# Patient Record
Sex: MALE | Race: WHITE | Employment: OTHER | URBAN - NONMETROPOLITAN AREA
[De-identification: names, ages, dates, MRNs, and addresses within clinical notes are randomized per-mention and may not be internally consistent; named-entity substitution may affect disease eponyms.]

---

## 2022-08-04 ENCOUNTER — OUTSIDE SERVICES (OUTPATIENT)
Dept: FAMILY MEDICINE CLINIC | Age: 77
End: 2022-08-04
Payer: MEDICARE

## 2022-08-04 DIAGNOSIS — N32.81 OVERACTIVE BLADDER: ICD-10-CM

## 2022-08-04 DIAGNOSIS — F03.90 DEMENTIA WITHOUT BEHAVIORAL DISTURBANCE, UNSPECIFIED DEMENTIA TYPE: ICD-10-CM

## 2022-08-04 DIAGNOSIS — R45.1 AGITATION: Primary | ICD-10-CM

## 2022-08-04 DIAGNOSIS — I25.10 CORONARY ARTERY DISEASE WITHOUT ANGINA PECTORIS, UNSPECIFIED VESSEL OR LESION TYPE, UNSPECIFIED WHETHER NATIVE OR TRANSPLANTED HEART: ICD-10-CM

## 2022-08-04 DIAGNOSIS — E78.5 HYPERLIPIDEMIA, UNSPECIFIED HYPERLIPIDEMIA TYPE: ICD-10-CM

## 2022-08-04 NOTE — PROGRESS NOTES
8/4/2022    Laureen Esparza  1945    This resident is being seen today for a follow-up evaluation visit. He is a resident who has long-term medical conditions including dementia with cognitive decline, type II non-insulin-dependent diabetes mellitus, nonproliferative retinopathy, hyperlipidemia, coronary disease status post CABG with percutaneous stenting, hypertension, situational depressive disorder, overactive bladder, COPD, episodic agitation with visual hallucinations and paranoia. He is a 68 y.o. male resident who is being seen today for a follow-up evaluation visit with resident residing here in the memory care unit and has been under assessment for agitation with hallucinations. This resident has had history of visual hallucinations with underlying paranoia and extreme jealousy and what could be considered sexual fixation with frustration. He did specifically ask me for the \"blue pill. \"  Apparently he did already speak with Dr. Erica Parham regarding this and it was not recommended at the time. It is of note to mention that his wife does reside here at the facility and lives across the bal in another room. He denies complaints with respect to pain, headaches or dizziness, sore throat, chest pain, coughing or shortness of breath, nausea or vomiting, constipation or diarrhea, dysuria or frequency, fever or chills, falls or syncopal events.         Medications:  Anoro Ellipta 62.5-25 1 puff daily  Artificial tears 1.4% 1 drop both eyes daily  Aspirin 81 mg daily  Atorvastatin 40 mg daily  Citalopram 10 mg daily  Clopidogrel 75 mg daily  Donepezil 10 mg twice daily  Gemtesa 75 mg daily  Glipizide 7.5 mg daily  Janumet XR  mg twice daily  Losartan 100 mg daily  Memantine 10 mg twice daily  Montelukast 10 mg daily  Bella solution 2% 1 drop both eyes daily  Omeprazole 20 mg daily  Prednisolone 1% 5 mL right eye daily  Quetiapine 25 mg twice daily  Refresh drops 1 drop both eyes daily  Sodium chloride 5% 1 drop both eyes daily  Venlafaxine 37.5 mg daily  Vitamin D3 50,000 units weekly  Acetaminophen as needed  Ativan 0.5 mg 3 times daily as needed        Objective     Vital Signs: /55 pulse 79 respirations 18 temperature 97.3 O2 92% weight 208 pounds        Physical examination:Skin is essentially warm and dry. HEENT unremarkable. Neck is supple. Heart regular rate and rhythm. No rubs, gallops or murmurs noted. Lungs are consistent with bibasilar rales without evidence of rhonchi or wheezing. Abdomen is soft, supple and non-tender. Bowel sounds are noted x4 quadrants. No rigidity, guarding or rebound tenderness. Negative Donnelly's, negative McBurney's, negative Dharmesh's. Extremities; no true pitting edema. Pulses are adequate. No clubbing  or no cyanosis noted. He does have some motion restriction noted to the shoulders with abduction and extension. Neurologically he  is alert and oriented x2. No evidence of paralysis or paresthesias noted. Diagnoses and all orders for this visit:    Agitation  Comments:  Initiate Ativan 0.5 mg 3 times a day as needed    Dementia without behavioral disturbance, unspecified dementia type ([facility] [address]  Comments:  Maintain donepezil with low-dose memantine    Hyperlipidemia, unspecified hyperlipidemia type  Comments:  Continue with Plavix and atorvastatin    Coronary artery disease without angina pectoris, unspecified vessel or lesion type, unspecified whether native or transplanted heart  Comments:  Maintain aspirin, Plavix with lipid and blood pressure management    Overactive bladder  Comments:  Currently controlled        Plan:  Plan of care was discussed with the healthcare team with medications and labs reviewed. PSA 4.02 vitamin D of 17 lipid panel within normal limits BUNs/creatinine 20/1.17 with a GFR of 60 H/H of 10.8/32. 8.   There has been some concern from staffing perspective given the fact that this resident has underlying agitation especially noted with his hallucinations and his increasing paranoia. I am therefore going to recommend Ativan 0.5 mg 3 times a day as needed and furthermore follow-up with him in the course of the next couple of weeks to determine if the medication has been utilized and effective. He will otherwise continue with his current management as stated and I will see him routinely and as needed with further orders forthcoming. TA VASQUEZ, APRN - CNP      *Note was creating using voice recognition software.   The document was reviewed however grammatical errors may exist.

## 2022-09-08 ENCOUNTER — OUTSIDE SERVICES (OUTPATIENT)
Dept: FAMILY MEDICINE CLINIC | Age: 77
End: 2022-09-08
Payer: MEDICARE

## 2022-09-08 DIAGNOSIS — E11.9 DIABETES MELLITUS TYPE II, NON INSULIN DEPENDENT (HCC): Primary | ICD-10-CM

## 2022-09-08 DIAGNOSIS — R44.3 HALLUCINATIONS: ICD-10-CM

## 2022-09-08 DIAGNOSIS — I10 PRIMARY HYPERTENSION: ICD-10-CM

## 2022-09-08 DIAGNOSIS — H54.7 POOR VISION: ICD-10-CM

## 2022-09-08 DIAGNOSIS — F32.A DEPRESSION, UNSPECIFIED DEPRESSION TYPE: ICD-10-CM

## 2022-09-08 NOTE — PROGRESS NOTES
9/8/2022    Esther Velez  1945    This resident is being seen today for a follow-up evaluation visit. He is a resident who has long-term medical conditions including dementia with cognitive decline, type II non-insulin-dependent diabetes mellitus, nonproliferative retinopathy, hyperlipidemia, coronary disease status post CABG with percutaneous stenting, hypertension, situational depressive disorder, overactive bladder, COPD, episodic agitation with visual hallucinations and paranoia. He is a 68 y.o. male resident who is being seen today for a follow-up evaluation visit with resident having ongoing h hypertension allucinations, as described by himself as well as staff. Staff also brings to my attention that he does have extremely poor vision with history of macular degeneration and has had cornea replacement several times. He has otherwise been doing relatively well and we have been checking his blood sugars twice a week, which have been within normal limits. At the time my evaluation he denied any complaints regarding pain, headaches or dizziness, sore throat, chest pain, coughing or shortness of breath, nausea or vomiting, constipation or diarrhea, dysuria or frequency, fever or chills, falls or syncopal events.             Medications:  Anoro Ellipta 62.5-25 1 puff daily  Artificial tears 1.4% 1 drop both eyes daily  Aspirin 81 mg daily  Atorvastatin 40 mg daily  Citalopram 10 mg daily  Clopidogrel 75 mg daily  Donepezil 10 mg twice daily  Gemtesa 75 mg daily  Glipizide 7.5 mg daily  Janumet XR  mg twice daily  Losartan 100 mg daily  Memantine 10 mg twice daily  Montelukast 10 mg daily  Bella solution 2% 1 drop both eyes daily  Omeprazole 20 mg daily  Prednisolone 1% 5 mL right eye daily  Quetiapine 25 mg twice daily  Refresh drops 1 drop both eyes daily  Sodium chloride 5% 1 drop both eyes daily  Venlafaxine 37.5 mg daily  Vitamin D3 50,000 units weekly  Acetaminophen as needed  Ativan 0.5 mg 3 times daily as needed        Objective     Vital Signs: /55 pulse 65 respirations 18 temperature 97.2 O2 94% weight 214.4 (up 6.2 pounds) pounds        Physical examination:Skin is essentially warm and dry. HEENT unremarkable. Neck is supple. Heart regular rate and rhythm. No rubs, gallops or murmurs noted. Lungs are consistent with bibasilar rales without evidence of rhonchi or wheezing. Abdomen is soft, supple and non-tender. Bowel sounds are noted x4 quadrants. No rigidity, guarding or rebound tenderness. Negative Donnelly's, negative McBurney's, negative Dharmesh's. Extremities; no true pitting edema. Pulses are adequate. No clubbing  or no cyanosis noted. He does have some motion restriction noted to the shoulders with abduction and extension. Neurologically he  is alert and oriented x2. No evidence of paralysis or paresthesias noted. Diagnoses and all orders for this visit:    Diabetes mellitus type II, non insulin dependent ([facility] [address]  Comments:  Maintain low-dose aspirin with glipizide and Janumet and Gemtesa    Hallucinations  Comments:  Maintain low-dose Seroquel with hallucinations possibly secondary to poor vision    Poor vision  Comments:  History of macular degeneration with cornea replacement    Primary hypertension  Comments:  Maintain low-dose aspirin and losartan    Depression, unspecified depression type  Comments:  Controlled with venlafaxine          Plan:  Plan of care was discussed with the healthcare team with medications and labs reviewed. As I did state, his blood sugars have been relatively stable, slowly recommend that we downward titrate his blood sugar Accu-Cheks from twice a week to once per week.   With regards to the fact that he does remain at increased risk of exposure to COVID, he will continue with upper titration of his vitamin D along with the benefit of vitamin C and zinc.  We will continue to monitor him regarding his hallucinations and maintain his current medical regimen as such. He will otherwise maintain his plan of care we will see him routinely and as needed with further orders forthcoming. TA VASQUEZ, APRN - CNP      *Note was creating using voice recognition software.   The document was reviewed however grammatical errors may exist.

## 2022-10-12 ENCOUNTER — OUTSIDE SERVICES (OUTPATIENT)
Dept: FAMILY MEDICINE CLINIC | Age: 77
End: 2022-10-12
Payer: MEDICARE

## 2022-10-12 DIAGNOSIS — N32.81 OVERACTIVE BLADDER: ICD-10-CM

## 2022-10-12 DIAGNOSIS — I25.10 CORONARY ARTERY DISEASE WITHOUT ANGINA PECTORIS, UNSPECIFIED VESSEL OR LESION TYPE, UNSPECIFIED WHETHER NATIVE OR TRANSPLANTED HEART: ICD-10-CM

## 2022-10-12 DIAGNOSIS — I10 PRIMARY HYPERTENSION: ICD-10-CM

## 2022-10-12 DIAGNOSIS — F32.A DEPRESSION, UNSPECIFIED DEPRESSION TYPE: ICD-10-CM

## 2022-10-12 DIAGNOSIS — R44.3 HALLUCINATIONS: Primary | ICD-10-CM

## 2022-10-12 NOTE — PROGRESS NOTES
10/12/2022    Keith Garza  1945    This resident is being seen today for a follow-up evaluation visit. He is a resident who has long-term medical conditions including dementia with cognitive decline, type II non-insulin-dependent diabetes mellitus, nonproliferative retinopathy, hyperlipidemia, coronary disease status post CABG with percutaneous stenting, hypertension, situational depressive disorder, overactive bladder, COPD, episodic agitation with visual hallucinations and paranoia. He is a 68 y.o. male resident who is being seen today for a follow-up evaluation visit with staff indicating that he has been relatively stable but he continues to have hallucinations from time to time. He does reside here on assisted living and Staff indicates that he has had no changes in behavior in terms of aggressive, combative, or disruptive behaviors. He currently denies complaints with respect to pain,headaches or dizziness, sore throat, chest pain, coughing or shortness of breath, nausea or vomiting, constipation or diarrhea, dysuria or frequency, fever or chills, falls or syncopal events.             Medications:  Anoro Ellipta 62.5-25 1 puff daily  Artificial tears 1.4% 1 drop both eyes daily  Aspirin 81 mg daily  Atorvastatin 40 mg daily  Citalopram 10 mg daily  Clopidogrel 75 mg daily  Donepezil 10 mg twice daily  Gemtesa 75 mg daily  Glipizide 7.5 mg daily  Janumet XR  mg twice daily  Losartan 100 mg daily  Memantine 10 mg twice daily  Montelukast 10 mg daily  Bella solution 2% 1 drop both eyes daily  Omeprazole 20 mg daily  Prednisolone 1% 5 mL right eye daily  Quetiapine 25 mg twice daily  Refresh drops 1 drop both eyes daily  Sodium chloride 5% 1 drop both eyes daily  Venlafaxine 37.5 mg daily  Vitamin D3 50,000 units weekly  Acetaminophen as needed  Ativan 0.5 mg 3 times daily as needed        Objective     Vital Signs: /56 pulse 81 respirations 16 temperature 97.6 O2 96% weight 216.8 pounds        Physical examination:Skin is essentially warm and dry. HEENT unremarkable. Neck is supple. Heart regular rate and rhythm. No rubs, gallops or murmurs noted. Lungs are consistent with bibasilar rales without evidence of rhonchi or wheezing. Abdomen is soft, supple and non-tender. Bowel sounds are noted x4 quadrants. No rigidity, guarding or rebound tenderness. Negative Donnelly's, negative McBurney's, negative Dharmesh's. Extremities; no true pitting edema. Pulses are adequate. No clubbing  or no cyanosis noted. He does have some motion restriction noted to the shoulders with abduction and extension. Neurologically he  is alert and oriented x2. No evidence of paralysis or paresthesias noted. Diagnoses and all orders for this visit:    Hallucinations  Comments:  Occurs from time to time and does maintain the benefit of quetiapine     Overactive bladder  Comments:  Stable    Coronary artery disease without angina pectoris, unspecified vessel or lesion type, unspecified whether native or transplanted heart  Comments:  Stable with low-dose aspirin with lipid and blood pressure management    Primary hypertension  Comments:  Maintain aspirin    Depression, unspecified depression type  Comments:  Stable with citalopram            Plan:  Plan of care was discussed with the healthcare team with medications and labs reviewed. Most recent vitamin D level of 67. However, he has not had his routine labs in an extended period of time so I will recommend follow-up labs in terms of a CBC, CMP, lipid panel and a hemoglobin A1c, to be drawn in the morning. He will furthermore continue with his current medical management, as he does appear to be tolerating it in a satisfactory manner. I will maintain recommendations to monitor his blood sugar on a weekly basis. He will otherwise continue with his plan of care as stated and I will see him routinely and as needed with further orders forthcoming.       Elpidio Joy ZOE VASQUEZ - CNP      *Note was creating using voice recognition software.   The document was reviewed however grammatical errors may exist.

## 2022-11-03 ENCOUNTER — OUTSIDE SERVICES (OUTPATIENT)
Dept: FAMILY MEDICINE CLINIC | Age: 77
End: 2022-11-03
Payer: MEDICARE

## 2022-11-03 DIAGNOSIS — K52.9 ACUTE GASTROENTERITIS: Primary | ICD-10-CM

## 2022-11-03 DIAGNOSIS — E78.5 HYPERLIPIDEMIA, UNSPECIFIED HYPERLIPIDEMIA TYPE: ICD-10-CM

## 2022-11-03 DIAGNOSIS — I25.10 CORONARY ARTERY DISEASE WITHOUT ANGINA PECTORIS, UNSPECIFIED VESSEL OR LESION TYPE, UNSPECIFIED WHETHER NATIVE OR TRANSPLANTED HEART: ICD-10-CM

## 2022-11-03 DIAGNOSIS — F03.90 DEMENTIA, UNSPECIFIED DEMENTIA SEVERITY, UNSPECIFIED DEMENTIA TYPE, UNSPECIFIED WHETHER BEHAVIORAL, PSYCHOTIC, OR MOOD DISTURBANCE OR ANXIETY (HCC): ICD-10-CM

## 2022-11-03 DIAGNOSIS — I10 PRIMARY HYPERTENSION: ICD-10-CM

## 2022-11-03 NOTE — PROGRESS NOTES
11/3/2022    Sangeeta Medley  1945    This resident is being seen today for a follow-up evaluation visit. He is a resident who has long-term medical conditions including dementia with cognitive decline, type II non-insulin-dependent diabetes mellitus, nonproliferative retinopathy, hyperlipidemia, coronary disease status post CABG with percutaneous stenting, hypertension, situational depressive disorder, overactive bladder, COPD, episodic agitation with visual hallucinations and paranoia. He is a 68 y.o. male resident who is being seen today for a follow-up evaluation visit with staff admitting that this resident does have a morning cough and does continue to utilize his as needed inhaler and follows up with the department of pulmonary medicine this month. He did have a recent gastroenteritis which lasted about 48 hours on the weekend of the 22nd. He furthermore denies any sore throat, chest pain, nausea or vomiting, constipation or diarrhea, dysuria or frequency, fever or chills, falls or syncopal events. Medications:  Anoro Ellipta 62.5-25 1 puff daily  Artificial tears 1.4% 1 drop both eyes daily  Aspirin 81 mg daily  Atorvastatin 40 mg daily  Citalopram 10 mg daily  Clopidogrel 75 mg daily  Donepezil 10 mg twice daily  Gemtesa 75 mg daily  Glipizide 7.5 mg daily  Janumet XR  mg twice daily  Losartan 100 mg daily  Memantine 10 mg twice daily  Montelukast 10 mg daily  Bella solution 2% 1 drop both eyes daily  Omeprazole 20 mg daily  Prednisolone 1% 5 mL right eye daily  Quetiapine 25 mg twice daily  Refresh drops 1 drop both eyes daily  Sodium chloride 5% 1 drop both eyes daily  Venlafaxine 37.5 mg daily  Vitamin D3 50,000 units weekly  Acetaminophen as needed  Ativan 0.5 mg 3 times daily as needed        Objective     Vital Signs: /62 pulse 84 respirations 18 temperature 97.3 O2 96% weight 220.2 (up 3.4 pounds) pounds        Physical examination:Skin is essentially warm and dry.  HEENT unremarkable. Neck is supple. Heart regular rate and rhythm. No rubs, gallops or murmurs noted. Lungs are consistent with bibasilar rales without evidence of rhonchi or wheezing. Abdomen is soft, supple and non-tender. Bowel sounds are noted x4 quadrants. No rigidity, guarding or rebound tenderness. Negative Donnelly's, negative McBurney's, negative Dharmesh's. Extremities; no true pitting edema. Pulses are adequate. No clubbing  or no cyanosis noted. He does have some motion restriction noted to the shoulders with abduction and extension. Neurologically he  is alert and oriented x2. No evidence of paralysis or paresthesias noted. Diagnoses and all orders for this visit:    Acute gastroenteritis  Comments:  Symptoms resolved    Hyperlipidemia, unspecified hyperlipidemia type  Comments:  Controlled with aspirin and atorvastatin    Dementia, unspecified dementia severity, unspecified dementia type, unspecified whether behavioral, psychotic, or mood disturbance or anxiety ([facility] [address]  Comments:  Continue with donepezil and memantine    Coronary artery disease without angina pectoris, unspecified vessel or lesion type, unspecified whether native or transplanted heart  Comments:  Maintain aspirin with lipid and blood pressure management    Primary hypertension  Comments:  Continue with aspirin and losartan              Plan:  Plan of care was discussed with the healthcare team with medications and labs reviewed. Lipid panel within normal limits BUN/creatinine 22/1.22 with a GFR of 61 hemoglobin A1c is 6.5 H/H of 10/31. As stated, his symptoms have resolved with respect to his gastroenteritis. He does continue with his inhaler as needed and will follow up with pulmonary medicine as stated. I will plan to follow-up with him in the course of 1 month for further reevaluation. He will otherwise continue with his current management and I will see him routinely and as needed with further orders forthcoming.       Kate Nails ZOE VANEGAS - CNP      *Note was creating using voice recognition software.   The document was reviewed however grammatical errors may exist.

## 2022-12-01 ENCOUNTER — OUTSIDE SERVICES (OUTPATIENT)
Dept: FAMILY MEDICINE CLINIC | Age: 77
End: 2022-12-01

## 2022-12-01 DIAGNOSIS — H54.7 POOR VISION: ICD-10-CM

## 2022-12-01 DIAGNOSIS — I25.10 CORONARY ARTERY DISEASE WITHOUT ANGINA PECTORIS, UNSPECIFIED VESSEL OR LESION TYPE, UNSPECIFIED WHETHER NATIVE OR TRANSPLANTED HEART: ICD-10-CM

## 2022-12-01 DIAGNOSIS — N32.81 OVERACTIVE BLADDER: ICD-10-CM

## 2022-12-01 DIAGNOSIS — E66.09 OBESITY DUE TO EXCESS CALORIES, UNSPECIFIED CLASSIFICATION, UNSPECIFIED WHETHER SERIOUS COMORBIDITY PRESENT: Primary | ICD-10-CM

## 2022-12-01 DIAGNOSIS — F32.A DEPRESSION, UNSPECIFIED DEPRESSION TYPE: ICD-10-CM

## 2022-12-01 NOTE — PROGRESS NOTES
12/1/2022    Pippa Mcdaniel  1945    This resident is being seen today for a follow-up evaluation visit. He is a resident who has long-term medical conditions including dementia with cognitive decline, type II non-insulin-dependent diabetes mellitus, nonproliferative retinopathy, hyperlipidemia, coronary disease status post CABG with percutaneous stenting, hypertension, situational depressive disorder, overactive bladder, COPD, episodic agitation with visual hallucinations and paranoia. He is a 68 y.o. male resident who is being seen today for a follow-up evaluation with this resident recently seen in conjunction with the department of pulmonology with Dr. Zaria Matthews. This is a gentleman who has a history of COPD and is a former smoker and was placed on a neuro inhaler twice daily and has recommendations for low-dose CAT scan of the chest in the near future. Pulmonologist did state that he should continue with 2 L of oxygen via nasal cannula at bedtime. He had also recommended that he lose weight due to obesity with staff indicating that he does have a relatively good appetite. He does remain responsive to verbal and tactile stimuli and he denies complaints regarding any pain, headaches or dizziness, sore throat, chest pain, coughing or shortness of breath, nausea or vomiting, constipation or diarrhea, dysuria or frequency, fever or chills, falls or syncopal events.                 Medications:  Anoro Ellipta 62.5-25 1 puff daily  Artificial tears 1.4% 1 drop both eyes daily  Aspirin 81 mg daily  Atorvastatin 40 mg daily  Citalopram 10 mg daily  Clopidogrel 75 mg daily  Donepezil 10 mg twice daily  Gemtesa 75 mg daily  Glipizide 7.5 mg daily  Janumet XR  mg twice daily  Losartan 100 mg daily  Memantine 10 mg twice daily  Montelukast 10 mg daily  Bella solution 2% 1 drop both eyes daily  Omeprazole 20 mg daily  Prednisolone 1% 5 mL right eye daily  Quetiapine 25 mg twice daily  Refresh drops 1 drop both continue to encourage him to cut back on his dietary intake due to the need for weight loss due to underlying obesity. He will continue to follow with Dr. Kate Zamora accordingly, with recommendations to follow-up in a year. He will otherwise maintain his plan of care as stated and I will see him routinely and as needed with further orders forthcoming. TA VASQUEZ, APRN - CNP      *Note was creating using voice recognition software.   The document was reviewed however grammatical errors may exist.

## 2023-01-18 LAB — TSH SERPL DL<=0.05 MIU/L-ACNC: 2.24 UIU/ML (ref 0.27–4.2)

## 2023-02-03 ENCOUNTER — OUTSIDE SERVICES (OUTPATIENT)
Dept: FAMILY MEDICINE CLINIC | Age: 78
End: 2023-02-03

## 2023-02-03 DIAGNOSIS — F03.90 DEMENTIA, UNSPECIFIED DEMENTIA SEVERITY, UNSPECIFIED DEMENTIA TYPE, UNSPECIFIED WHETHER BEHAVIORAL, PSYCHOTIC, OR MOOD DISTURBANCE OR ANXIETY (HCC): ICD-10-CM

## 2023-02-03 DIAGNOSIS — E11.9 DIABETES MELLITUS TYPE II, NON INSULIN DEPENDENT (HCC): ICD-10-CM

## 2023-02-03 DIAGNOSIS — R60.9 PERIPHERAL EDEMA: ICD-10-CM

## 2023-02-03 DIAGNOSIS — R06.02 SHORTNESS OF BREATH: Primary | ICD-10-CM

## 2023-02-03 DIAGNOSIS — I10 PRIMARY HYPERTENSION: ICD-10-CM

## 2023-02-03 NOTE — PROGRESS NOTES
2/3/2023    Keon Koch  1945    This resident is being seen today for a follow-up evaluation visit. He is a resident who has long-term medical conditions including dementia with cognitive decline, type II non-insulin-dependent diabetes mellitus, nonproliferative retinopathy, hyperlipidemia, coronary disease status post CABG with percutaneous stenting, hypertension, situational depressive disorder, overactive bladder, COPD, episodic agitation with visual hallucinations and paranoia. He is a 68 y.o. male resident who is being seen today for a follow-up visit with resident recently under assessment for an upper respiratory infection with which she was given the benefit of a Z-Geovanni with a Medrol Dosepak, both of which began on 1/26/2023. Unfortunately, he does continue to have cough with increased shortness of breath and notable audible wheezing. He denies any chest pain, nausea or vomiting, constipation or diarrhea, dysuria or frequency, fever or chills, falls or syncopal events. Medications:  Anoro Ellipta 62.5-25 1 puff daily  Artificial tears 1.4% 1 drop both eyes daily  Aspirin 81 mg daily  Atorvastatin 40 mg daily  Citalopram 10 mg daily  Clopidogrel 75 mg daily  Donepezil 10 mg twice daily  Gemtesa 75 mg daily  Glipizide 7.5 mg daily  Janumet XR  mg twice daily  Losartan 100 mg daily  Memantine 10 mg twice daily  Montelukast 10 mg daily  Bella solution 2% 1 drop both eyes daily  Omeprazole 20 mg daily  Prednisolone 1% 5 mL right eye daily  Quetiapine 25 mg twice daily  Refresh drops 1 drop both eyes daily  Sodium chloride 5% 1 drop both eyes daily  Venlafaxine 37.5 mg daily  Vitamin D3 50,000 units weekly  Acetaminophen as needed  Ativan 0.5 mg 3 times daily as needed        Objective     Vital Signs: /84 pulse 72 respirations 18 temperature 97.9 O2 93%  weight 226.8 pounds        Physical examination:Skin is essentially warm and dry. HEENT unremarkable. Neck is supple.  Heart regular rate and rhythm. No rubs, gallops or murmurs noted. Lungs are consistent with notable crackles throughout with an expiratory wheeze. Abdomen is soft, supple and non-tender. Bowel sounds are noted x4 quadrants. No rigidity, guarding or rebound tenderness. Negative Donnelly's, negative McBurney's, negative Dharmesh's. Extremities; mild 1+ pitting edema to the bilateral lower extremities. Pulses are adequate. No clubbing  or no cyanosis noted. He does have some motion restriction noted to the shoulders with abduction and extension. Neurologically he  is alert and oriented x2. No evidence of paralysis or paresthesias noted. Diagnoses and all orders for this visit:    Shortness of breath  Comments:  Initiate Levaquin with DuoNeb's, prednisone taper, diuretic management    Peripheral edema  Comments:  Initiate Lasix over the course of the next 10 days with potassium supplementation    Dementia, unspecified dementia severity, unspecified dementia type, unspecified whether behavioral, psychotic, or mood disturbance or anxiety ([facility])  Comments:  Maintain low-dose aspirin with memantine and donepezil    Diabetes mellitus type II, non insulin dependent ([facility])  Comments:  Maintain low-dose aspirin, glipizide, Janumet    Primary hypertension  Comments:  Maintain low-dose aspirin and losartan                  Plan:  Plan of care was discussed with the healthcare team with medications and labs reviewed. I do feel that this resident does need ongoing antibiotic therapy. I am therefore going to recommend Levaquin 750 mg daily for the course of the next 10 days with DuoNeb's 3 times a day for the course of 10 days and prednisone 30 mg daily for 3 days then 20 mg daily for 3 days then 10 mg daily for 3 days then 5 mg daily for 3 days with discontinuation thereafter.   I will furthermore place him on Lasix 40 mg daily for 3 days then 20 mg daily for 7 days along with potassium supplementation and evaluation of a BMP in 1 month.  He will otherwise continue with his current management and I will plan to reevaluate him in the course of the next 1 to 2 weeks. TA VASQUEZ, APRN - CNP      *Note was creating using voice recognition software.   The document was reviewed however grammatical errors may exist.

## 2023-02-08 ENCOUNTER — OUTSIDE SERVICES (OUTPATIENT)
Dept: FAMILY MEDICINE CLINIC | Age: 78
End: 2023-02-08

## 2023-02-08 DIAGNOSIS — I25.10 CORONARY ARTERY DISEASE WITHOUT ANGINA PECTORIS, UNSPECIFIED VESSEL OR LESION TYPE, UNSPECIFIED WHETHER NATIVE OR TRANSPLANTED HEART: ICD-10-CM
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
## 2023-02-08 DIAGNOSIS — R42 DIZZINESS: ICD-10-CM

## 2023-02-08 DIAGNOSIS — E86.0 DEHYDRATION: ICD-10-CM

## 2023-02-08 DIAGNOSIS — E78.5 HYPERLIPIDEMIA, UNSPECIFIED HYPERLIPIDEMIA TYPE: ICD-10-CM

## 2023-02-09 NOTE — PROGRESS NOTES
2/8/2023    Li Butt  1945    This resident is being seen today for an acute evaluation visit. He is a resident who has long-term medical conditions including dementia with cognitive decline, type II non-insulin-dependent diabetes mellitus, nonproliferative retinopathy, hyperlipidemia, coronary disease status post CABG with percutaneous stenting, hypertension, situational depressive disorder, overactive bladder, COPD, episodic agitation with visual hallucinations and paranoia. He is a 68 y.o. male resident who is being seen today for an acute evaluation visit secondary to increased weakness with what was being described as dizziness and a fall. It is of note to mention that he has been under assessment for an upper respiratory infection was given the benefit of Levaquin. Staff indicates that his cough is improved but he does remain short of breath. He did have imaging studies completed on 2/7/2023 with evidence of poor inspiratory effort without active airspace disease. Furthermore, he also had a completion of a CT of his head at that time with volume loss and small vessel ischemic changes. Testing was completed in the hospital setting due to his fall with staff concern regarding new onset weakness. At this point in time, resident admits to shortness of breath but denies chest pain, nausea or vomiting, constipation or diarrhea, dysuria or frequency, fever or chills, syncopal events or seizure activity.              Medications:  Anoro Ellipta 62.5-25 1 puff daily  Artificial tears 1.4% 1 drop both eyes daily  Aspirin 81 mg daily  Atorvastatin 40 mg daily  Citalopram 10 mg daily  Clopidogrel 75 mg daily  Donepezil 10 mg twice daily  Gemtesa 75 mg daily  Glipizide 7.5 mg daily  Janumet XR  mg twice daily  Losartan 100 mg daily  Memantine 10 mg twice daily  Montelukast 10 mg daily  Bella solution 2% 1 drop both eyes daily  Omeprazole 20 mg daily  Prednisolone 1% 5 mL right eye daily  Quetiapine 25 mg twice daily  Refresh drops 1 drop both eyes daily  Sodium chloride 5% 1 drop both eyes daily  Venlafaxine 37.5 mg daily  Vitamin D3 50,000 units weekly  Acetaminophen as needed  Ativan 0.5 mg 3 times daily as needed        Objective     Vital Signs: /82 pulse 100 respirations 18 temperature 97.7 O2 92%  weight 226.8 pounds        Physical examination:Skin is essentially warm and dry. HEENT unremarkable. Neck is supple. Heart regular rate and rhythm. No rubs, gallops or murmurs noted. Lungs are essentially clear to auscultation without evidence of any rhonchi, rales or wheezing at this time. .  Abdomen is soft, supple and non-tender. Bowel sounds are noted x4 quadrants. No rigidity, guarding or rebound tenderness. Negative Donnelly's, negative McBurney's, negative Dharmesh's. Extremities; mild 1+ pitting edema to the bilateral lower extremities. Pulses are adequate. No clubbing  or no cyanosis noted. He does have some motion restriction noted to the shoulders with abduction and extension. Neurologically he  is alert and oriented x2. No evidence of paralysis or paresthesias noted. Diagnoses and all orders for this visit:    Fall, sequela  Comments:  Seen in the emergency room setting on 2/7/2023 and imaging studies completed with respect to a CT of the head and a chest x-ray    Dizziness  Comments:  Possible side effect to Levaquin which will be discontinued at this time given the benefit of doxycycline    Dehydration  Comments:  Encourage intake of water    Coronary artery disease without angina pectoris, unspecified vessel or lesion type, unspecified whether native or transplanted heart  Comments:  Maintain aspirin with lipid and blood pressure management    Hyperlipidemia, unspecified hyperlipidemia type  Comments:  Controlled with atorvastatin with low-dose aspirin                    Plan:  Plan of care was discussed with the healthcare team with medications and labs reviewed.   H/H 10/32.6 BUNs/creatinine 44/1.80 with a GFR of 38.1. CT of the head as discussed along with the chest x-ray, both of which were completed in the hospital setting. Due to the dizziness, I am going to recommend discontinuation of his Levaquin at this time, as it was the newest addition and it can cause dizziness. Given the fact that he does have the residual cough, I will place him on doxycycline 100 mg twice a day for 5 days. I will discontinue the recommendations for his BMP in the morning and obtain 1 in 1 week and encourage the resident to drink more water throughout the day. He will furthermore remain under assessment for his upper respiratory status and I will see him routinely and as needed with further orders forthcoming. TA VASQUEZ, APRN - CNP      *Note was creating using voice recognition software.   The document was reviewed however grammatical errors may exist.

## 2023-02-15 LAB
ANION GAP SERPL CALCULATED.3IONS-SCNC: 9 MMOL/L (ref 7–16)
BUN BLDV-MCNC: 21 MG/DL (ref 6–23)
CALCIUM SERPL-MCNC: 8.6 MG/DL (ref 8.6–10.2)
CHLORIDE BLD-SCNC: 107 MMOL/L (ref 98–107)
CO2: 27 MMOL/L (ref 22–29)
CREAT SERPL-MCNC: 1.4 MG/DL (ref 0.7–1.2)
GFR SERPL CREATININE-BSD FRML MDRD: 52 ML/MIN/1.73
GLUCOSE BLD-MCNC: 82 MG/DL (ref 74–99)
POTASSIUM SERPL-SCNC: 4.8 MMOL/L (ref 3.5–5)
SODIUM BLD-SCNC: 143 MMOL/L (ref 132–146)

## 2023-03-01 ENCOUNTER — OUTSIDE SERVICES (OUTPATIENT)
Dept: FAMILY MEDICINE CLINIC | Age: 78
End: 2023-03-01
Payer: MEDICARE

## 2023-03-01 DIAGNOSIS — I10 PRIMARY HYPERTENSION: ICD-10-CM

## 2023-03-01 DIAGNOSIS — R53.1 WEAKNESS: Primary | ICD-10-CM

## 2023-03-01 DIAGNOSIS — F32.A DEPRESSION, UNSPECIFIED DEPRESSION TYPE: ICD-10-CM

## 2023-03-01 DIAGNOSIS — D50.9 IRON DEFICIENCY ANEMIA, UNSPECIFIED IRON DEFICIENCY ANEMIA TYPE: ICD-10-CM

## 2023-03-01 DIAGNOSIS — N32.81 OVERACTIVE BLADDER: ICD-10-CM

## 2023-03-01 PROCEDURE — 99349 HOME/RES VST EST MOD MDM 40: CPT | Performed by: NURSE PRACTITIONER

## 2023-03-01 NOTE — PROGRESS NOTES
3/1/2023    Arabella Tapia  1945    This resident is being seen today for a follow-up evaluation visit. He is a resident who has long-term medical conditions including dementia with cognitive decline, type II non-insulin-dependent diabetes mellitus, nonproliferative retinopathy, hyperlipidemia, coronary disease status post CABG with percutaneous stenting, hypertension, situational depressive disorder, overactive bladder, COPD, episodic agitation with visual hallucinations and paranoia. He is a 68 y.o. male resident who is being seen today for a follow-up evaluation visit with resident residing in the memory care unit and was recently seen in conjunction with physical therapy services. He has been under assessment for upper respiratory tract infection/bronchitis and has been placed on antibiotic therapy in this regard. He did have some ongoing decline and was utilizing a wheelchair for the course of the week, with which therapy was recommended Staff indicates that he is doing relatively well in this regard and is now using a cane for ambulation. He offers no acute complaints on today's evaluation regarding any pain, headaches or dizziness, sore throat, chest pain, coughing or shortness of breath, nausea or vomiting, constipation or diarrhea, dysuria or frequency, fever or chills, falls or syncopal events.                Medications:  Anoro Ellipta 62.5-25 1 puff daily  Artificial tears 1.4% 1 drop both eyes daily  Aspirin 81 mg daily  Atorvastatin 40 mg daily  Citalopram 10 mg daily  Clopidogrel 75 mg daily  Donepezil 10 mg twice daily  Gemtesa 75 mg daily  Glipizide 7.5 mg daily  Janumet XR  mg twice daily  Losartan 100 mg daily  Memantine 10 mg twice daily  Montelukast 10 mg daily  Bella solution 2% 1 drop both eyes daily  Omeprazole 20 mg daily  Prednisolone 1% 5 mL right eye daily  Quetiapine 25 mg twice daily  Refresh drops 1 drop both eyes daily  Sodium chloride 5% 1 drop both eyes daily  Venlafaxine 37.5 mg daily  Vitamin D3 50,000 units weekly  Acetaminophen as needed  Ativan 0.5 mg 3 times daily as needed        Objective     Vital Signs: /73 pulse 68 respirations 18 temperature 96.7 O2 92%  weight 227.4 pounds        Physical examination:Skin is essentially warm and dry. HEENT unremarkable. Neck is supple. Heart regular rate and rhythm. No rubs, gallops or murmurs noted. Lungs are essentially clear to auscultation without evidence of any rhonchi, rales or wheezing at this time. .  Abdomen is soft, supple and non-tender. Bowel sounds are noted x4 quadrants. No rigidity, guarding or rebound tenderness. Negative Donnelly's, negative McBurney's, negative Dharmesh's. Extremities; mild 1+ pitting edema to the bilateral lower extremities. Pulses are adequate. No clubbing  or no cyanosis noted. He does have some motion restriction noted to the shoulders with abduction and extension. Neurologically he  is alert and oriented x2. No evidence of paralysis or paresthesias noted. Diagnoses and all orders for this visit:    Weakness  Comments:  Status post physical therapy with resident utilizing a cane at this time    Iron deficiency anemia, unspecified iron deficiency anemia type  Comments:  Status post initiation of iron twice daily with recommendations for repeat iron studies and a CBC on 3/8/2023    Primary hypertension  Comments:  Maintain low-dose aspirin with losartan    Depression, unspecified depression type  Comments:  Currently controlled and does maintain quetiapine with citalopram    Overactive bladder  Comments:  Stable                      Plan:  Plan of care was discussed with the healthcare team with medications and labs reviewed. BUNs/creatinine 14/1.80 with a GFR of 38.1 with an H/H of 10/32.6 PSA 3.67 iron level of 31 TIBC 376% saturation 8 with which he did have recent initiation of iron supplementation twice daily.   He does remain relatively stable but does continue with some ongoing edema to the bilateral lower extremities. He does maintain the benefit of BRUCE hose in this regard and I did recommend that he intermittently offload. Furthermore, I will make recommendations for follow-up labs with a repeat CBC, CMP, iron studies and a B12 level. He will furthermore maintain his plan of care as stated and I will see him routinely and as needed with further orders forthcoming. TA VASQUEZ, APRN - CNP      *Note was creating using voice recognition software.   The document was reviewed however grammatical errors may exist.

## 2023-03-08 LAB
ALBUMIN SERPL-MCNC: 3.5 G/DL (ref 3.5–5.2)
ALP BLD-CCNC: 96 U/L (ref 40–129)
ALT SERPL-CCNC: 14 U/L (ref 0–40)
ANION GAP SERPL CALCULATED.3IONS-SCNC: 11 MMOL/L (ref 7–16)
ANISOCYTOSIS: ABNORMAL
AST SERPL-CCNC: 17 U/L (ref 0–39)
BASOPHILS ABSOLUTE: 0.04 E9/L (ref 0–0.2)
BASOPHILS RELATIVE PERCENT: 0.7 % (ref 0–2)
BILIRUB SERPL-MCNC: <0.2 MG/DL (ref 0–1.2)
BUN BLDV-MCNC: 23 MG/DL (ref 6–23)
BURR CELLS: ABNORMAL
CALCIUM SERPL-MCNC: 8.4 MG/DL (ref 8.6–10.2)
CHLORIDE BLD-SCNC: 107 MMOL/L (ref 98–107)
CO2: 25 MMOL/L (ref 22–29)
CREAT SERPL-MCNC: 1.3 MG/DL (ref 0.7–1.2)
EOSINOPHILS ABSOLUTE: 0.12 E9/L (ref 0.05–0.5)
EOSINOPHILS RELATIVE PERCENT: 2.2 % (ref 0–6)
GFR SERPL CREATININE-BSD FRML MDRD: 56 ML/MIN/1.73
GLUCOSE BLD-MCNC: 117 MG/DL (ref 74–99)
HCT VFR BLD CALC: 34.9 % (ref 37–54)
HEMOGLOBIN: 10 G/DL (ref 12.5–16.5)
IMMATURE GRANULOCYTES #: 0.04 E9/L
IMMATURE GRANULOCYTES %: 0.7 % (ref 0–5)
IRON SATURATION: 15 % (ref 20–55)
IRON: 54 MCG/DL (ref 59–158)
LYMPHOCYTES ABSOLUTE: 1.12 E9/L (ref 1.5–4)
LYMPHOCYTES RELATIVE PERCENT: 20.2 % (ref 20–42)
MCH RBC QN AUTO: 24.8 PG (ref 26–35)
MCHC RBC AUTO-ENTMCNC: 28.7 % (ref 32–34.5)
MCV RBC AUTO: 86.4 FL (ref 80–99.9)
MONOCYTES ABSOLUTE: 0.66 E9/L (ref 0.1–0.95)
MONOCYTES RELATIVE PERCENT: 11.9 % (ref 2–12)
NEUTROPHILS ABSOLUTE: 3.56 E9/L (ref 1.8–7.3)
NEUTROPHILS RELATIVE PERCENT: 64.3 % (ref 43–80)
OVALOCYTES: ABNORMAL
PDW BLD-RTO: 21.2 FL (ref 11.5–15)
PLATELET # BLD: 339 E9/L (ref 130–450)
PMV BLD AUTO: 10.2 FL (ref 7–12)
POIKILOCYTES: ABNORMAL
POLYCHROMASIA: ABNORMAL
POTASSIUM SERPL-SCNC: 4.8 MMOL/L (ref 3.5–5)
RBC # BLD: 4.04 E12/L (ref 3.8–5.8)
SCHISTOCYTES: ABNORMAL
SODIUM BLD-SCNC: 143 MMOL/L (ref 132–146)
TOTAL IRON BINDING CAPACITY: 361 MCG/DL (ref 250–450)
TOTAL PROTEIN: 6 G/DL (ref 6.4–8.3)
VITAMIN B-12: 266 PG/ML (ref 211–946)
WBC # BLD: 5.5 E9/L (ref 4.5–11.5)

## 2023-04-05 ENCOUNTER — OUTSIDE SERVICES (OUTPATIENT)
Dept: FAMILY MEDICINE CLINIC | Age: 78
End: 2023-04-05

## 2023-04-05 DIAGNOSIS — K59.00 CONSTIPATION, UNSPECIFIED CONSTIPATION TYPE: Primary | ICD-10-CM

## 2023-04-05 DIAGNOSIS — E11.9 DIABETES MELLITUS TYPE II, NON INSULIN DEPENDENT (HCC): ICD-10-CM

## 2023-04-05 DIAGNOSIS — F03.90 DEMENTIA, UNSPECIFIED DEMENTIA SEVERITY, UNSPECIFIED DEMENTIA TYPE, UNSPECIFIED WHETHER BEHAVIORAL, PSYCHOTIC, OR MOOD DISTURBANCE OR ANXIETY (HCC): ICD-10-CM

## 2023-04-05 DIAGNOSIS — E78.5 HYPERLIPIDEMIA, UNSPECIFIED HYPERLIPIDEMIA TYPE: ICD-10-CM

## 2023-04-05 DIAGNOSIS — E66.09 OBESITY DUE TO EXCESS CALORIES, UNSPECIFIED CLASSIFICATION, UNSPECIFIED WHETHER SERIOUS COMORBIDITY PRESENT: ICD-10-CM

## 2023-04-05 NOTE — PROGRESS NOTES
4/5/2023    Ben Dixon  1945    This resident is being seen today for a follow-up evaluation visit. He is a resident who has long-term medical conditions including dementia with cognitive decline, type II non-insulin-dependent diabetes mellitus, nonproliferative retinopathy, hyperlipidemia, coronary disease status post CABG with percutaneous stenting, hypertension, situational depressive disorder, overactive bladder, COPD, episodic agitation with visual hallucinations and paranoia. He is a 68 y.o. male resident who is being seen today for a follow-up visit with staff indicating that this resident has been doing relatively well but there has been some concerns regarding some constipation. He has otherwise been doing relatively well and he denies complaints with respect to headaches or dizziness, sore throat, chest pain, coughing or shortness of breath, nausea or vomiting, dysuria or frequency, fever or chills, falls or syncopal events. This is a gentleman who does reside here in memory care unit of assisted living and Staff indicates that he has had no changes in behavior in terms of aggressive, combative, or disruptive behaviors.               Medications:  Anoro Ellipta 62.5-25 1 puff daily  Artificial tears 1.4% 1 drop both eyes daily  Aspirin 81 mg daily  Atorvastatin 40 mg daily  Citalopram 10 mg daily  Clopidogrel 75 mg daily  Colace 100 mg daily  Donepezil 10 mg twice daily  Gemtesa 75 mg daily  Glipizide 7.5 mg daily  Janumet XR  mg twice daily  Losartan 100 mg daily  Memantine 10 mg twice daily  Montelukast 10 mg daily  Bella solution 2% 1 drop both eyes daily  Omeprazole 20 mg daily  Prednisolone 1% 5 mL right eye daily  Quetiapine 25 mg twice daily  Refresh drops 1 drop both eyes daily  Sodium chloride 5% 1 drop both eyes daily  Venlafaxine 37.5 mg daily  Vitamin D3 50,000 units weekly  Acetaminophen as needed  Ativan 0.5 mg 3 times daily as needed        Objective     Vital Signs: BP

## 2023-05-24 ENCOUNTER — OUTSIDE SERVICES (OUTPATIENT)
Dept: FAMILY MEDICINE CLINIC | Age: 78
End: 2023-05-24

## 2023-05-24 DIAGNOSIS — K59.00 CONSTIPATION, UNSPECIFIED CONSTIPATION TYPE: Primary | ICD-10-CM

## 2023-05-24 DIAGNOSIS — E11.9 DIABETES MELLITUS TYPE II, NON INSULIN DEPENDENT (HCC): ICD-10-CM

## 2023-05-24 DIAGNOSIS — N32.81 OVERACTIVE BLADDER: ICD-10-CM

## 2023-05-24 DIAGNOSIS — D50.9 IRON DEFICIENCY ANEMIA, UNSPECIFIED IRON DEFICIENCY ANEMIA TYPE: ICD-10-CM

## 2023-05-24 DIAGNOSIS — I10 PRIMARY HYPERTENSION: ICD-10-CM

## 2023-05-24 RX ORDER — ERGOCALCIFEROL 1.25 MG/1
CAPSULE ORAL
Qty: 12 CAPSULE | Refills: 3 | Status: SHIPPED | OUTPATIENT
Start: 2023-05-24

## 2023-05-24 NOTE — PROGRESS NOTES
5/24/2023    Patsy Leyden  1945    This resident is being seen today for a follow-up evaluation visit. He is a resident who has long-term medical conditions including dementia with cognitive decline, type II non-insulin-dependent diabetes mellitus, nonproliferative retinopathy, hyperlipidemia, coronary disease status post CABG with percutaneous stenting, hypertension, situational depressive disorder, overactive bladder, COPD, episodic agitation with visual hallucinations and paranoia. He is a 68 y.o. male resident who is being seen today for a follow-up evaluation with this resident residing in the memory care unit and doing relatively well. He does remain under assessment for blood sugar management, which have been essentially within normal limits. He offers no complaints of today's evaluation regarding pain, headaches or dizziness, sore throat, chest pain, coughing or shortness of breath, nausea or vomiting, constipation or diarrhea, fever or chills, falls or syncopal events. Medications:  Anoro Ellipta 62.5-25 1 puff daily  Artificial tears 1.4% 1 drop both eyes daily  Aspirin 81 mg daily  Atorvastatin 40 mg daily  Citalopram 10 mg daily  Clopidogrel 75 mg daily  Colace 100 mg twice daily  Donepezil 10 mg twice daily  Gemtesa 75 mg daily  Glipizide 7.5 mg daily  Janumet XR  mg twice daily  Losartan 100 mg daily  Memantine 10 mg twice daily  Montelukast 10 mg daily  Bella solution 2% 1 drop both eyes daily  Omeprazole 20 mg daily  Prednisolone 1% 5 mL right eye daily  Quetiapine 25 mg twice daily  Refresh drops 1 drop both eyes daily  Sodium chloride 5% 1 drop both eyes daily  Venlafaxine 37.5 mg daily  Vitamin D3 50,000 units weekly  Acetaminophen as needed  Ativan 0.5 mg 3 times daily as needed        Objective     Vital Signs: /66 pulse 80 respirations 16 temperature 98.9 O2 93%  weight 231.1 pounds         Physical examination:Skin is essentially warm and dry.  HEENT

## 2023-06-07 LAB
IRON SATN MFR SERPL: 17 % (ref 20–55)
IRON SERPL-MCNC: 56 MCG/DL (ref 59–158)
TIBC SERPL-MCNC: 328 MCG/DL (ref 250–450)

## 2023-06-28 LAB — BNP BLD-MCNC: 95 PG/ML (ref 0–450)

## 2023-06-29 LAB
ALBUMIN SERPL-MCNC: 3.9 G/DL (ref 3.5–5.2)
ALP SERPL-CCNC: 101 U/L (ref 40–129)
ALT SERPL-CCNC: 23 U/L (ref 0–40)
ANION GAP SERPL CALCULATED.3IONS-SCNC: 15 MMOL/L (ref 7–16)
AST SERPL-CCNC: 20 U/L (ref 0–39)
BILIRUB DIRECT SERPL-MCNC: <0.2 MG/DL (ref 0–0.3)
BILIRUB INDIRECT SERPL-MCNC: NORMAL MG/DL (ref 0–1)
BILIRUB SERPL-MCNC: <0.2 MG/DL (ref 0–1.2)
BUN SERPL-MCNC: 19 MG/DL (ref 6–23)
CALCIUM SERPL-MCNC: 9.1 MG/DL (ref 8.6–10.2)
CHLORIDE SERPL-SCNC: 100 MMOL/L (ref 98–107)
CHOLESTEROL, TOTAL: 118 MG/DL (ref 0–199)
CK SERPL-CCNC: 129 U/L (ref 20–200)
CO2 SERPL-SCNC: 25 MMOL/L (ref 22–29)
CREAT SERPL-MCNC: 1.3 MG/DL (ref 0.7–1.2)
GLUCOSE SERPL-MCNC: 130 MG/DL (ref 74–99)
HDLC SERPL-MCNC: 41 MG/DL
LDLC SERPL CALC-MCNC: 62 MG/DL (ref 0–99)
POTASSIUM SERPL-SCNC: 4.4 MMOL/L (ref 3.5–5)
PROT SERPL-MCNC: 7 G/DL (ref 6.4–8.3)
SODIUM SERPL-SCNC: 140 MMOL/L (ref 132–146)
TRIGL SERPL-MCNC: 74 MG/DL (ref 0–149)
VLDLC SERPL CALC-MCNC: 15 MG/DL

## 2023-06-30 ENCOUNTER — OUTSIDE SERVICES (OUTPATIENT)
Dept: FAMILY MEDICINE CLINIC | Age: 78
End: 2023-06-30

## 2023-06-30 DIAGNOSIS — R09.02 OXYGEN DESATURATION: Primary | ICD-10-CM

## 2023-06-30 DIAGNOSIS — I10 PRIMARY HYPERTENSION: ICD-10-CM

## 2023-06-30 DIAGNOSIS — I25.10 CORONARY ARTERY DISEASE WITHOUT ANGINA PECTORIS, UNSPECIFIED VESSEL OR LESION TYPE, UNSPECIFIED WHETHER NATIVE OR TRANSPLANTED HEART: ICD-10-CM

## 2023-06-30 DIAGNOSIS — R53.1 WEAKNESS: ICD-10-CM

## 2023-06-30 DIAGNOSIS — R60.9 PERIPHERAL EDEMA: ICD-10-CM

## 2023-06-30 LAB
BACTERIA URNS QL MICRO: ABNORMAL /HPF
BASOPHILS # BLD: 0.03 E9/L (ref 0–0.2)
BASOPHILS NFR BLD: 0.5 % (ref 0–2)
BILIRUB UR QL STRIP: NEGATIVE
CLARITY UR: CLEAR
COLOR UR: YELLOW
EOSINOPHIL # BLD: 0.17 E9/L (ref 0.05–0.5)
EOSINOPHIL NFR BLD: 2.9 % (ref 0–6)
EPI CELLS #/AREA URNS HPF: ABNORMAL /HPF
ERYTHROCYTE [DISTWIDTH] IN BLOOD BY AUTOMATED COUNT: 16.9 FL (ref 11.5–15)
GLUCOSE UR STRIP-MCNC: NEGATIVE MG/DL
HBA1C MFR BLD: 7.6 % (ref 4–5.6)
HCT VFR BLD AUTO: 40.2 % (ref 37–54)
HGB BLD-MCNC: 12 G/DL (ref 12.5–16.5)
HGB UR QL STRIP: NEGATIVE
IMM GRANULOCYTES # BLD: 0.02 E9/L
IMM GRANULOCYTES NFR BLD: 0.3 % (ref 0–5)
KETONES UR STRIP-MCNC: NEGATIVE MG/DL
LEUKOCYTE ESTERASE UR QL STRIP: ABNORMAL
LYMPHOCYTES # BLD: 1.09 E9/L (ref 1.5–4)
LYMPHOCYTES NFR BLD: 18.4 % (ref 20–42)
MCH RBC QN AUTO: 27.1 PG (ref 26–35)
MCHC RBC AUTO-ENTMCNC: 29.9 % (ref 32–34.5)
MCV RBC AUTO: 91 FL (ref 80–99.9)
MONOCYTES # BLD: 0.65 E9/L (ref 0.1–0.95)
MONOCYTES NFR BLD: 10.9 % (ref 2–12)
NEUTROPHILS # BLD: 3.98 E9/L (ref 1.8–7.3)
NEUTS SEG NFR BLD: 67 % (ref 43–80)
NITRITE UR QL STRIP: NEGATIVE
PH UR STRIP: 6 [PH] (ref 5–9)
PLATELET # BLD AUTO: 238 E9/L (ref 130–450)
PMV BLD AUTO: 10 FL (ref 7–12)
PROT UR STRIP-MCNC: NEGATIVE MG/DL
RBC # BLD AUTO: 4.42 E12/L (ref 3.8–5.8)
RBC #/AREA URNS HPF: ABNORMAL /HPF (ref 0–2)
SP GR UR STRIP: >=1.03 (ref 1–1.03)
UROBILINOGEN UR STRIP-ACNC: 0.2 E.U./DL
WBC # BLD: 5.9 E9/L (ref 4.5–11.5)
WBC #/AREA URNS HPF: ABNORMAL /HPF (ref 0–5)

## 2023-07-01 LAB — BACTERIA UR CULT: NORMAL

## 2023-08-23 ENCOUNTER — OUTSIDE SERVICES (OUTPATIENT)
Dept: FAMILY MEDICINE CLINIC | Age: 78
End: 2023-08-23

## 2023-08-23 DIAGNOSIS — R44.3 HALLUCINATIONS: ICD-10-CM
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
## 2023-08-23 DIAGNOSIS — E11.9 DIABETES MELLITUS TYPE II, NON INSULIN DEPENDENT (HCC): ICD-10-CM

## 2023-08-23 DIAGNOSIS — R42 DIZZINESS: ICD-10-CM

## 2023-08-23 DIAGNOSIS — E78.5 HYPERLIPIDEMIA, UNSPECIFIED HYPERLIPIDEMIA TYPE: ICD-10-CM

## 2023-08-23 NOTE — PROGRESS NOTES
mg 3 times daily as needed        Objective     Vital Signs: /80 pulse 94 respirations 20 temperature 98.1 O2 93%  weight 224.4 pounds         Physical examination:Skin is essentially warm and dry. HEENT unremarkable. Neck is supple. Heart regular rate and rhythm. No rubs, gallops or murmurs noted. Lungs are essentially clear to auscultation without evidence of any rhonchi, rales or wheezing at this time. .  Abdomen is soft, supple and non-tender. Bowel sounds are noted x4 quadrants. No rigidity, guarding or rebound tenderness. Negative Donnelly's, negative McBurney's, negative Dharmesh's. Extremities; mild 1+ pitting edema to the bilateral lower extremities. Pulses are adequate. No clubbing  or no cyanosis noted. He does have some motion restriction noted to the shoulders with abduction and extension. Neurologically he  is alert and oriented x2. No evidence of paralysis or paresthesias noted. Diagnoses and all orders for this visit:    Fall, initial encounter  Comments:  Occurred 8/20/2023 without apparent injury    Hyperlipidemia, unspecified hyperlipidemia type  Comments:  Status post upward titration of atorvastatin with close observation of lipid panel    Hallucinations  Comments:  Status post upward titration of Seroquel    Diabetes mellitus type II, non insulin dependent (HCC)  Comments:  Status post upward titration of Janumet    Dizziness  Comments:  Possibly related to positional changes with vision changes                                Plan:  Plan of care was discussed with the healthcare team with medications and labs reviewed. He did have labs completed in June which were noted and reviewed. He had recent changes made with respect to his medication management including upward titration of Seroquel, Janumet and atorvastatin. He is tolerating these changes in a satisfactory manner.   He does not appear to have any injuries associated with his fall but does remain under close observation and

## 2023-09-07 ENCOUNTER — OUTSIDE SERVICES (OUTPATIENT)
Dept: FAMILY MEDICINE CLINIC | Age: 78
End: 2023-09-07

## 2023-09-07 DIAGNOSIS — N32.81 OVERACTIVE BLADDER: ICD-10-CM

## 2023-09-07 DIAGNOSIS — I10 PRIMARY HYPERTENSION: Primary | ICD-10-CM

## 2023-09-07 DIAGNOSIS — D50.9 IRON DEFICIENCY ANEMIA, UNSPECIFIED IRON DEFICIENCY ANEMIA TYPE: ICD-10-CM

## 2023-09-07 DIAGNOSIS — F03.90 DEMENTIA, UNSPECIFIED DEMENTIA SEVERITY, UNSPECIFIED DEMENTIA TYPE, UNSPECIFIED WHETHER BEHAVIORAL, PSYCHOTIC, OR MOOD DISTURBANCE OR ANXIETY (HCC): ICD-10-CM

## 2023-09-07 DIAGNOSIS — I25.10 CORONARY ARTERY DISEASE WITHOUT ANGINA PECTORIS, UNSPECIFIED VESSEL OR LESION TYPE, UNSPECIFIED WHETHER NATIVE OR TRANSPLANTED HEART: ICD-10-CM

## 2023-09-07 LAB
ALBUMIN SERPL-MCNC: 3.9 G/DL (ref 3.5–5.2)
ALP SERPL-CCNC: 94 U/L (ref 40–129)
ALT SERPL-CCNC: 14 U/L (ref 0–40)
AST SERPL-CCNC: 16 U/L (ref 0–39)
BILIRUB DIRECT SERPL-MCNC: <0.2 MG/DL (ref 0–0.3)
BILIRUB INDIRECT SERPL-MCNC: NORMAL MG/DL (ref 0–1)
BILIRUB SERPL-MCNC: 0.2 MG/DL (ref 0–1.2)
CHOLEST SERPL-MCNC: 95 MG/DL
HDLC SERPL-MCNC: 31 MG/DL
LDLC SERPL CALC-MCNC: 49 MG/DL
PROT SERPL-MCNC: 6.5 G/DL (ref 6.4–8.3)
TRIGL SERPL-MCNC: 73 MG/DL
VLDLC SERPL CALC-MCNC: 15 MG/DL

## 2023-09-07 NOTE — PROGRESS NOTES
daily  Venlafaxine 37.5 mg daily  Vitamin D3 50,000 units weekly  Acetaminophen as needed  Ativan 0.5 mg 3 times daily as needed        Objective     Vital Signs: /84 pulse 91 respirations 20 temperature 96.8 O2 95%  weight 221 pounds         Physical examination:Skin is essentially warm and dry. HEENT unremarkable. Neck is supple. Heart regular rate and rhythm. No rubs, gallops or murmurs noted. Lungs are essentially clear to auscultation without evidence of any rhonchi, rales or wheezing at this time. .  Abdomen is soft, supple and non-tender. Bowel sounds are noted x4 quadrants. No rigidity, guarding or rebound tenderness. Negative Donnelly's, negative McBurney's, negative Dharmesh's. Extremities; mild 1+ pitting edema to the bilateral lower extremities. Pulses are adequate. No clubbing  or no cyanosis noted. He does have some motion restriction noted to the shoulders with abduction and extension. Neurologically he  is alert and oriented x2. No evidence of paralysis or paresthesias noted. Diagnoses and all orders for this visit:    Primary hypertension  Comments:  Currently controlled with low-dose aspirin and losartan    Iron deficiency anemia, unspecified iron deficiency anemia type  Comments:  Controlled with ferrous sulfate    Dementia, unspecified dementia severity, unspecified dementia type, unspecified whether behavioral, psychotic, or mood disturbance or anxiety (HCC)  Comments:  Stable with low-dose aspirin with donepezil and memantine    Coronary artery disease without angina pectoris, unspecified vessel or lesion type, unspecified whether native or transplanted heart  Comments:  Stable with aspirin with lipid and blood pressure management    Overactive bladder  Comments:  Maintain Gemtesa                                  Plan:  Plan of care was discussed with the healthcare team with medications and labs reviewed. H/H 12/40.2 BUNs/creatinine 19/1.3.   He does appear to have a relatively high

## 2023-09-20 ENCOUNTER — OUTSIDE SERVICES (OUTPATIENT)
Dept: FAMILY MEDICINE CLINIC | Age: 78
End: 2023-09-20
Payer: MEDICARE

## 2023-09-20 DIAGNOSIS — R44.3 HALLUCINATIONS: Primary | ICD-10-CM

## 2023-09-20 DIAGNOSIS — K59.00 CONSTIPATION, UNSPECIFIED CONSTIPATION TYPE: ICD-10-CM

## 2023-09-20 DIAGNOSIS — E78.5 HYPERLIPIDEMIA, UNSPECIFIED HYPERLIPIDEMIA TYPE: ICD-10-CM

## 2023-09-20 DIAGNOSIS — I10 PRIMARY HYPERTENSION: ICD-10-CM

## 2023-09-20 DIAGNOSIS — F32.A DEPRESSION, UNSPECIFIED DEPRESSION TYPE: ICD-10-CM

## 2023-09-20 PROCEDURE — 99349 HOME/RES VST EST MOD MDM 40: CPT | Performed by: NURSE PRACTITIONER

## 2023-09-20 NOTE — PROGRESS NOTES
should change. Recommendations at this time will include a repeat hemoglobin A1c at the beginning in November and I did ask that staff monitor him for any complaints of nightmares. He will furthermore continue with his plan of care and I will see him routinely and as needed with further orders forthcoming. TA VASQUEZ, APRN - CNP      *Note was creating using voice recognition software.   The document was reviewed however grammatical errors may exist.

## 2023-10-11 ENCOUNTER — OUTSIDE SERVICES (OUTPATIENT)
Dept: FAMILY MEDICINE CLINIC | Age: 78
End: 2023-10-11

## 2023-10-11 DIAGNOSIS — F03.90 DEMENTIA, UNSPECIFIED DEMENTIA SEVERITY, UNSPECIFIED DEMENTIA TYPE, UNSPECIFIED WHETHER BEHAVIORAL, PSYCHOTIC, OR MOOD DISTURBANCE OR ANXIETY (HCC): ICD-10-CM

## 2023-10-11 DIAGNOSIS — R09.02 OXYGEN DESATURATION: ICD-10-CM

## 2023-10-11 DIAGNOSIS — I25.10 CORONARY ARTERY DISEASE WITHOUT ANGINA PECTORIS, UNSPECIFIED VESSEL OR LESION TYPE, UNSPECIFIED WHETHER NATIVE OR TRANSPLANTED HEART: ICD-10-CM

## 2023-10-11 DIAGNOSIS — N32.81 OVERACTIVE BLADDER: ICD-10-CM

## 2023-10-11 DIAGNOSIS — I10 PRIMARY HYPERTENSION: Primary | ICD-10-CM

## 2023-10-11 NOTE — PROGRESS NOTES
10/11/2023    Fleet Sarks  1945    This resident is being seen today for a follow-up evaluation visit. He is a resident who has long-term medical conditions including dementia with cognitive decline, type II non-insulin-dependent diabetes mellitus, nonproliferative retinopathy, hyperlipidemia, coronary disease status post CABG with percutaneous stenting, hypertension, situational depressive disorder, overactive bladder, COPD, episodic agitation with visual hallucinations and paranoia. He is a 66 y.o. male resident who is being seen today for a follow-up/2-week evaluation with resident having elevated blood pressures. Staff also brings to my attention that he has been low in the morning hours and high in the afternoon hours and that he also had evidence of clinical desaturation in the recent week past and staff has been monitoring his oxygen saturation. Furthermore, he has had some incontinence of his bowels more often than he has in times past.  He is alert and responsive to verbal and tactile stimuli and is in no acute distress. He states he has no pain, headaches or dizziness, sore throat, chest pain, coughing or shortness of breath, nausea or vomiting, constipation or diarrhea, fever or chills, falls or syncopal events.             Medications:  Acetaminophen 650 mg at bedtime  Anoro Ellipta 62.5-25 1 puff daily  Artificial tears 1.4% 1 drop both eyes daily  Aspirin 81 mg daily  Atorvastatin 80 mg daily  Azelastine spray 0.1% 2 sprays each nostril twice daily  Citalopram 10 mg daily  Clopidogrel 75 mg daily  Colace 100 mg twice daily  Donepezil 10 mg twice daily  Ferrous sulfate 325 mg 3 times daily  Fluticasone 50 mcg 1 spray each nostril daily  Gemtesa 75 mg daily  Glipizide 7.5 mg daily  Janumet XR  mg twice daily  Losartan 50 mg twice daily  Memantine 10 mg twice daily  Montelukast 10 mg daily  Omeprazole 20 mg daily  Prednisolone 1% 5 mL right eye daily  Quetiapine 50 mg twice

## 2023-10-18 ENCOUNTER — TELEPHONE (OUTPATIENT)
Dept: FAMILY MEDICINE CLINIC | Age: 78
End: 2023-10-18

## 2023-10-19 ENCOUNTER — TELEPHONE (OUTPATIENT)
Dept: FAMILY MEDICINE CLINIC | Age: 78
End: 2023-10-19

## 2023-10-19 NOTE — TELEPHONE ENCOUNTER
[facility] site states Patricia Bennett could not be found in the [employer] system. Pharmacy can call [facility] or someone can fill out the faxed form. I do not have the info needed to complete this form. So I faxed it back to [facility] for them or Belle Sharif to complete. Form scanned in to pt. Chart.

## 2023-10-25 LAB
ANION GAP SERPL CALCULATED.3IONS-SCNC: 19 MMOL/L (ref 7–16)
BUN SERPL-MCNC: 18 MG/DL (ref 6–23)
CALCIUM SERPL-MCNC: 8.7 MG/DL (ref 8.6–10.2)
CHLORIDE SERPL-SCNC: 100 MMOL/L (ref 98–107)
CO2 SERPL-SCNC: 21 MMOL/L (ref 22–29)
CREAT SERPL-MCNC: 1.1 MG/DL (ref 0.7–1.2)
GFR SERPL CREATININE-BSD FRML MDRD: >60 ML/MIN/1.73M2
GLUCOSE SERPL-MCNC: 86 MG/DL (ref 74–99)
POTASSIUM SERPL-SCNC: 4.1 MMOL/L (ref 3.5–5)
SODIUM SERPL-SCNC: 140 MMOL/L (ref 132–146)

## 2023-10-25 RX ORDER — OMEPRAZOLE 20 MG/1
20 CAPSULE, DELAYED RELEASE ORAL DAILY
Qty: 90 CAPSULE | Refills: 1 | Status: SHIPPED | OUTPATIENT
Start: 2023-10-25

## 2023-11-01 LAB — HBA1C MFR BLD: 6.4 % (ref 4–5.6)

## 2023-11-29 ENCOUNTER — OUTSIDE SERVICES (OUTPATIENT)
Dept: FAMILY MEDICINE CLINIC | Age: 78
End: 2023-11-29

## 2023-11-29 DIAGNOSIS — I10 PRIMARY HYPERTENSION: Primary | ICD-10-CM

## 2023-11-29 DIAGNOSIS — R42 DIZZINESS: ICD-10-CM

## 2023-11-29 DIAGNOSIS — R60.9 PERIPHERAL EDEMA: ICD-10-CM

## 2023-11-29 DIAGNOSIS — F32.A DEPRESSION, UNSPECIFIED DEPRESSION TYPE: ICD-10-CM

## 2023-11-29 DIAGNOSIS — R15.9 FULL INCONTINENCE OF FECES: ICD-10-CM

## 2023-11-30 NOTE — PROGRESS NOTES
11/29/2023    Rollo Baumgarten  1945    This resident is being seen today for a follow-up evaluation visit. He is a resident who has long-term medical conditions including dementia with cognitive decline, type II non-insulin-dependent diabetes mellitus, nonproliferative retinopathy, hyperlipidemia, coronary disease status post CABG with percutaneous stenting, hypertension, situational depressive disorder, overactive bladder, COPD, episodic agitation with visual hallucinations and paranoia. He is a 66 y.o. male resident who is being seen today for a follow-up evaluation with staff indicating that he has had a notable broken blood vessel to his great eye which is the second occurrence. He denies pain or pressure regarding this. Staff also brought to my attention that he has had an increase with his accidents of bowels which they feel is more behavioral oriented. He has been under recent assessment for blood pressure management with which I did split his losartan given the fact that he was having some dizzy spells and Staff indicates that his dizziness has resolved but he does unfortunately continue to have a relatively high pressure. He is alert responsive to verbal and tactile stimuli and offers no complaints regarding any headaches or current dizziness, sore throat, chest pain, coughing or shortness of breath, nausea or vomiting, constipation or diarrhea, fever or chills, syncopal events or seizure activity.         Medications:  Acetaminophen 650 mg at bedtime  Anoro Ellipta 62.5-25 1 puff daily  Artificial tears 1.4% 1 drop both eyes daily  Aspirin 81 mg daily  Atorvastatin 80 mg daily  Azelastine spray 0.1% 2 sprays each nostril twice daily  Citalopram 10 mg daily  Clopidogrel 75 mg daily  Colace 100 mg twice daily  Donepezil 10 mg twice daily  Ferrous sulfate 325 mg 3 times daily  Fluticasone 50 mcg 1 spray each nostril daily  Gemtesa 75 mg daily  Glipizide 7.5 mg daily  Janumet XR  mg twice

## 2024-01-03 LAB
ANION GAP SERPL CALCULATED.3IONS-SCNC: 21 MMOL/L (ref 7–16)
BUN SERPL-MCNC: 40 MG/DL (ref 6–23)
CALCIUM SERPL-MCNC: 9.1 MG/DL (ref 8.6–10.2)
CHLORIDE SERPL-SCNC: 100 MMOL/L (ref 98–107)
CO2 SERPL-SCNC: 19 MMOL/L (ref 22–29)
CREAT SERPL-MCNC: 1.5 MG/DL (ref 0.7–1.2)
GFR SERPL CREATININE-BSD FRML MDRD: 49 ML/MIN/1.73M2
GLUCOSE SERPL-MCNC: 94 MG/DL (ref 74–99)
POTASSIUM SERPL-SCNC: 4.5 MMOL/L (ref 3.5–5)
SODIUM SERPL-SCNC: 140 MMOL/L (ref 132–146)

## 2024-01-05 RX ORDER — ERGOCALCIFEROL 1.25 MG/1
CAPSULE ORAL
Qty: 13 CAPSULE | Refills: 3 | Status: SHIPPED | OUTPATIENT
Start: 2024-01-05

## 2024-01-05 RX ORDER — QUETIAPINE FUMARATE 50 MG/1
50 TABLET, FILM COATED ORAL 2 TIMES DAILY
Qty: 180 TABLET | Refills: 3 | Status: SHIPPED | OUTPATIENT
Start: 2024-01-05

## 2024-01-18 RX ORDER — ATORVASTATIN CALCIUM 40 MG/1
40 TABLET, FILM COATED ORAL DAILY
COMMUNITY
Start: 2022-07-18 | End: 2024-01-18 | Stop reason: SDUPTHER

## 2024-01-18 RX ORDER — ATORVASTATIN CALCIUM 80 MG/1
80 TABLET, FILM COATED ORAL DAILY
Qty: 90 TABLET | Refills: 1 | Status: SHIPPED | OUTPATIENT
Start: 2024-01-18 | End: 2024-04-17

## 2024-01-24 ENCOUNTER — OUTSIDE SERVICES (OUTPATIENT)
Dept: FAMILY MEDICINE CLINIC | Age: 79
End: 2024-01-24
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
## 2024-01-24 DIAGNOSIS — R53.1 ALTERATION IN MOBILITY DUE TO WEAKNESS: Primary | ICD-10-CM

## 2024-01-24 DIAGNOSIS — E11.9 DIABETES MELLITUS TYPE II, NON INSULIN DEPENDENT (HCC): ICD-10-CM

## 2024-01-24 DIAGNOSIS — R42 DIZZINESS: ICD-10-CM

## 2024-01-24 DIAGNOSIS — F03.90 DEMENTIA, UNSPECIFIED DEMENTIA SEVERITY, UNSPECIFIED DEMENTIA TYPE, UNSPECIFIED WHETHER BEHAVIORAL, PSYCHOTIC, OR MOOD DISTURBANCE OR ANXIETY (HCC): ICD-10-CM

## 2024-01-25 LAB
ANION GAP SERPL CALCULATED.3IONS-SCNC: 17 MMOL/L (ref 7–16)
BUN SERPL-MCNC: 34 MG/DL (ref 6–23)
CALCIUM SERPL-MCNC: 8.5 MG/DL (ref 8.6–10.2)
CHLORIDE SERPL-SCNC: 101 MMOL/L (ref 98–107)
CO2 SERPL-SCNC: 21 MMOL/L (ref 22–29)
CREAT SERPL-MCNC: 1.4 MG/DL (ref 0.7–1.2)
GFR SERPL CREATININE-BSD FRML MDRD: 54 ML/MIN/1.73M2
GLUCOSE SERPL-MCNC: 99 MG/DL (ref 74–99)
POTASSIUM SERPL-SCNC: 4.4 MMOL/L (ref 3.5–5)
SODIUM SERPL-SCNC: 139 MMOL/L (ref 132–146)

## 2024-01-25 NOTE — PROGRESS NOTES
1/24/2024    Rafy De Guzman  1945    This resident is being seen today for a follow-up evaluation visit.  He is a resident who has long-term medical conditions including dementia with cognitive decline, type II non-insulin-dependent diabetes mellitus, nonproliferative retinopathy, hyperlipidemia, coronary disease status post CABG with percutaneous stenting, hypertension, situational depressive disorder, overactive bladder, COPD, episodic agitation with visual hallucinations and paranoia.  He is a 78 y.o. male resident who is being seen today for a follow-up visit with staffing indicating that he has some ongoing behaviors and there are days that he refuses to eat.  Furthermore he has been incontinent of bowel and has had changes with mobility with decreased strength and has been receiving physical therapy.  He did have an episode of dizziness on January 12 and he had a clinical desaturation and they applied oxygen which was effective.  He also indicated that his heart rate had gone from 52-99 at that time and he has had no further episodes.  There is concern given the fact that he has had ongoing decline and is going to be evaluated to be transferred to [facility].  Staff states that there was a day when he stated that he was not physically strong enough to walk and literally flailed his body onto the ground.  He does follow with Dr. Richard and just had recent discontinuation of his Celexa.  At this point he offers no complaints with respect to headaches or dizziness, sore throat, chest pain, coughing or shortness of breath, nausea or vomiting, constipation or diarrhea, fever or chills, syncopal events or seizure activity.      Medications:  Acetaminophen 650 mg at bedtime  Anoro Ellipta 62.5-25 1 puff daily  Artificial tears 1.4% 1 drop both eyes daily  Aspirin 81 mg daily  Atorvastatin 80 mg daily  Azelastine spray 0.1% 2 sprays each nostril twice daily  Clopidogrel 75 mg daily  Colace 100 mg

## 2024-02-07 RX ORDER — SITAGLIPTIN AND METFORMIN HYDROCHLORIDE 1000; 50 MG/1; MG/1
TABLET, FILM COATED, EXTENDED RELEASE ORAL
Qty: 180 TABLET | Refills: 3 | Status: SHIPPED | OUTPATIENT
Start: 2024-02-07

## 2024-02-19 LAB
BILIRUB UR QL STRIP: NEGATIVE
CLARITY UR: CLEAR
COLOR UR: YELLOW
GLUCOSE UR STRIP-MCNC: NEGATIVE MG/DL
HGB UR QL STRIP.AUTO: NEGATIVE
KETONES UR STRIP-MCNC: NEGATIVE MG/DL
LEUKOCYTE ESTERASE UR QL STRIP: ABNORMAL
NITRITE UR QL STRIP: NEGATIVE
PH UR STRIP: 6 [PH] (ref 5–9)
PROT UR STRIP-MCNC: NEGATIVE MG/DL
PSA SERPL-MCNC: 3.1 NG/ML (ref 0–4)
RBC #/AREA URNS HPF: ABNORMAL /HPF
SP GR UR STRIP: 1.01 (ref 1–1.03)
UROBILINOGEN UR STRIP-ACNC: 0.2 EU/DL (ref 0–1)
WBC #/AREA URNS HPF: ABNORMAL /HPF

## 2024-02-28 RX ORDER — HYDROCHLOROTHIAZIDE 25 MG/1
TABLET ORAL
Qty: 30 TABLET | Refills: 5 | Status: SHIPPED | OUTPATIENT
Start: 2024-02-28

## 2024-02-28 RX ORDER — HYDROCHLOROTHIAZIDE 25 MG/1
TABLET ORAL
COMMUNITY
Start: 2023-12-23 | End: 2024-02-28 | Stop reason: SDUPTHER

## 2024-03-07 RX ORDER — GLIPIZIDE 5 MG/1
2.5 TABLET ORAL EVERY MORNING
Qty: 15 TABLET | Refills: 2 | Status: SHIPPED | OUTPATIENT
Start: 2024-03-07 | End: 2024-06-05

## 2024-03-07 RX ORDER — DONEPEZIL HYDROCHLORIDE 10 MG/1
10 TABLET, FILM COATED ORAL 2 TIMES DAILY
Qty: 180 TABLET | Refills: 1 | Status: SHIPPED | OUTPATIENT
Start: 2024-03-07

## 2024-03-21 ENCOUNTER — OUTSIDE SERVICES (OUTPATIENT)
Dept: FAMILY MEDICINE CLINIC | Age: 79
End: 2024-03-21

## 2024-03-21 DIAGNOSIS — D50.9 IRON DEFICIENCY ANEMIA, UNSPECIFIED IRON DEFICIENCY ANEMIA TYPE: ICD-10-CM

## 2024-03-21 DIAGNOSIS — R53.1 ALTERATION IN MOBILITY DUE TO WEAKNESS: ICD-10-CM

## 2024-03-21 DIAGNOSIS — Z53.20 ASSESSMENT OF PHYSICAL HEALTH DECLINED: Primary | ICD-10-CM

## 2024-03-21 DIAGNOSIS — I10 PRIMARY HYPERTENSION: ICD-10-CM

## 2024-03-21 DIAGNOSIS — U07.1 COVID: ICD-10-CM

## 2024-03-21 NOTE — PROGRESS NOTES
3/21/2024    Rafy De Guzman  1945    This resident is being seen today for a follow-up evaluation visit.  He is a resident who has long-term medical conditions including dementia with cognitive decline, type II non-insulin-dependent diabetes mellitus, nonproliferative retinopathy, hyperlipidemia, coronary disease status post CABG with percutaneous stenting, hypertension, situational depressive disorder, overactive bladder, COPD, episodic agitation with visual hallucinations and paranoia.  He is a 78 y.o. male resident who is being seen today for a follow-up evaluation with some acute concerns from staffing perspective due to ongoing decline physically with which she has been more dependent on a wheelchair and utilizes a cane and has had 2 recent falls.  Furthermore, he has been refusing to eat and has lost 9 pounds.  He states that he has a dislike for the food staff does feel that it is escalated because he is aware that he is moving to a long-term care setting.  He is status post COVID and has fairly residual cough.  He has no increased shortness of breath, chest pain, nausea or vomiting, constipation or diarrhea, fever or chills, syncopal events or seizure activity.        Medications:  Acetaminophen 650 mg at bedtime  Anoro Ellipta 62.5-25 1 puff daily  Artificial tears 1.4% 1 drop both eyes daily  Aspirin 81 mg daily  Atorvastatin 80 mg daily  Azelastine spray 0.1% 2 sprays each nostril twice daily  Clopidogrel 75 mg daily  Colace 100 mg daily  Donepezil 10 mg twice daily  Effexor 150 mg every morning  Ferrous sulfate 325 mg 3 times daily  Fluticasone 50 mcg 1 spray each nostril daily  Gemtesa 75 mg daily  Glipizide 5 mg daily  Janumet XR  mg twice daily  Losartan 50 mg twice daily  Memantine 10 mg twice daily  Montelukast 10 mg daily  Omeprazole 20 mg daily  Prednisolone 1% 5 mL right eye daily  Quetiapine 50 mg twice daily  Refresh drops 1 drop both eyes daily  Sodium chloride 5% 1 drop both eyes

## 2024-03-22 LAB
ALBUMIN SERPL-MCNC: 3.7 G/DL (ref 3.5–5.2)
ALP SERPL-CCNC: 83 U/L (ref 40–129)
ALT SERPL-CCNC: 13 U/L (ref 0–40)
ANION GAP SERPL CALCULATED.3IONS-SCNC: 18 MMOL/L (ref 7–16)
AST SERPL-CCNC: 17 U/L (ref 0–39)
BILIRUB SERPL-MCNC: 0.2 MG/DL (ref 0–1.2)
BUN SERPL-MCNC: 44 MG/DL (ref 6–23)
CALCIUM SERPL-MCNC: 8.9 MG/DL (ref 8.6–10.2)
CHLORIDE SERPL-SCNC: 99 MMOL/L (ref 98–107)
CO2 SERPL-SCNC: 22 MMOL/L (ref 22–29)
CREAT SERPL-MCNC: 1.7 MG/DL (ref 0.7–1.2)
GFR SERPL CREATININE-BSD FRML MDRD: 40 ML/MIN/1.73M2
GLUCOSE SERPL-MCNC: 80 MG/DL (ref 74–99)
POTASSIUM SERPL-SCNC: 4.8 MMOL/L (ref 3.5–5)
PROT SERPL-MCNC: 6.1 G/DL (ref 6.4–8.3)
SODIUM SERPL-SCNC: 139 MMOL/L (ref 132–146)

## 2024-04-17 RX ORDER — VENLAFAXINE HYDROCHLORIDE 150 MG/1
CAPSULE, EXTENDED RELEASE ORAL
COMMUNITY
Start: 2024-04-13

## 2024-04-30 RX ORDER — VENLAFAXINE HYDROCHLORIDE 150 MG/1
CAPSULE, EXTENDED RELEASE ORAL
Qty: 30 CAPSULE | Refills: 5 | OUTPATIENT
Start: 2024-04-30

## 2024-07-22 RX ORDER — HYDROCHLOROTHIAZIDE 25 MG/1
TABLET ORAL
Qty: 90 TABLET | Refills: 3 | Status: SHIPPED | OUTPATIENT
Start: 2024-07-22

## 2024-08-06 RX ORDER — GLIPIZIDE 2.5 MG/1
1 TABLET ORAL DAILY
Qty: 90 TABLET | Refills: 1 | Status: SHIPPED | OUTPATIENT
Start: 2024-08-06

## 2024-08-17 RX ORDER — GLIPIZIDE 2.5 MG/1
1 TABLET ORAL DAILY
Qty: 90 TABLET | Refills: 1 | OUTPATIENT
Start: 2024-08-17

## 2024-09-04 RX ORDER — VENLAFAXINE HYDROCHLORIDE 150 MG/1
150 CAPSULE, EXTENDED RELEASE ORAL DAILY
Qty: 30 CAPSULE | Refills: 0 | OUTPATIENT
Start: 2024-09-04

## 2024-09-14 RX ORDER — VENLAFAXINE HYDROCHLORIDE 150 MG/1
150 CAPSULE, EXTENDED RELEASE ORAL DAILY
Qty: 90 CAPSULE | Refills: 1 | OUTPATIENT
Start: 2024-09-14

## 2024-09-22 RX ORDER — VENLAFAXINE HYDROCHLORIDE 150 MG/1
150 CAPSULE, EXTENDED RELEASE ORAL DAILY
Qty: 90 CAPSULE | Refills: 1 | Status: SHIPPED | OUTPATIENT
Start: 2024-09-22

## 2024-09-23 RX ORDER — DONEPEZIL HYDROCHLORIDE 10 MG/1
10 TABLET, FILM COATED ORAL 2 TIMES DAILY
Qty: 180 TABLET | Refills: 1 | Status: SHIPPED | OUTPATIENT
Start: 2024-09-23

## 2024-10-02 RX ORDER — MEMANTINE HYDROCHLORIDE 10 MG/1
10 TABLET ORAL 2 TIMES DAILY
COMMUNITY
Start: 2024-08-06 | End: 2024-10-02 | Stop reason: SDUPTHER

## 2024-10-02 RX ORDER — MEMANTINE HYDROCHLORIDE 10 MG/1
10 TABLET ORAL 2 TIMES DAILY
Qty: 180 TABLET | Refills: 3 | Status: SHIPPED | OUTPATIENT
Start: 2024-10-02

## 2024-11-27 RX ORDER — LOSARTAN POTASSIUM 50 MG/1
50 TABLET ORAL 2 TIMES DAILY
Qty: 180 TABLET | Refills: 3 | OUTPATIENT
Start: 2024-11-27

## 2024-12-11 RX ORDER — SITAGLIPTIN AND METFORMIN HYDROCHLORIDE 1000; 50 MG/1; MG/1
TABLET, FILM COATED, EXTENDED RELEASE ORAL
Qty: 180 TABLET | Refills: 3 | Status: SHIPPED | OUTPATIENT
Start: 2024-12-11

## 2024-12-11 NOTE — TELEPHONE ENCOUNTER
Name of Medication(s) Requested:  Requested Prescriptions     Pending Prescriptions Disp Refills    JANUMET XR  MG TB24 per extended release tablet [Pharmacy Med Name: Janumet XR  MG Oral Tablet Extended Release 24 Hour] 180 tablet 3     Sig: TAKE 1 TABLET BY MOUTH TWICE  DAILY AFTER BREAKFAST /SUPPER       Medication is on current medication list Yes    Dosage and directions were verified? Yes    Quantity verified: 90 day supply     Pharmacy Verified?  Yes    Last Appointment:  Visit date not found    Future appts:  No future appointments.     (If no appt send self scheduling link. .REFILLAPPT)  Scheduling request sent?     [] Yes  [x] No    Does patient need updated?  [] Yes  [] No

## 2025-01-23 NOTE — TELEPHONE ENCOUNTER
Patient is at [facility].    Name of Medication(s) Requested:  Requested Prescriptions     Pending Prescriptions Disp Refills    glipiZIDE 2.5 MG TABS [Pharmacy Med Name: GLIPIZIDE  2.5MG  TAB] 90 tablet 3     Sig: TAKE 1 TABLET BY MOUTH DAILY       Medication is on current medication list Yes    Dosage and directions were verified? Yes    Quantity verified: 90 day supply     Pharmacy Verified?  Yes    Last Appointment:  Visit date not found    Future appts:  No future appointments.     (If no appt send self scheduling link. .REFILLAPPT)  Scheduling request sent?     [] Yes  [x] No    Does patient need updated?  [] Yes  [x] No

## 2025-01-26 RX ORDER — GLIPIZIDE 2.5 MG/1
1 TABLET ORAL DAILY
Qty: 90 TABLET | Refills: 3 | Status: SHIPPED | OUTPATIENT
Start: 2025-01-26

## 2025-03-03 RX ORDER — VENLAFAXINE HYDROCHLORIDE 150 MG/1
150 CAPSULE, EXTENDED RELEASE ORAL DAILY
Qty: 90 CAPSULE | Refills: 3 | Status: SHIPPED | OUTPATIENT
Start: 2025-03-03

## 2025-03-03 NOTE — TELEPHONE ENCOUNTER
Name of Medication(s) Requested:  Requested Prescriptions     Pending Prescriptions Disp Refills    venlafaxine (EFFEXOR XR) 150 MG extended release capsule [Pharmacy Med Name: Venlafaxine HCl  MG Oral Capsule Extended Release 24 Hour] 90 capsule 3     Sig: TAKE 1 CAPSULE BY MOUTH DAILY       Medication is on current medication list Yes    Dosage and directions were verified? Yes    Quantity verified: 90 day supply     Pharmacy Verified?  Yes    Last Appointment:  Visit date not found    Future appts:  No future appointments.     (If no appt send self scheduling link. .REFILLAPPT)  Scheduling request sent?     [] Yes  [x] No    Does patient need updated?  [] Yes  [x] No

## 2025-04-22 RX ORDER — OMEPRAZOLE 20 MG/1
20 CAPSULE, DELAYED RELEASE ORAL DAILY
Qty: 90 CAPSULE | Refills: 1 | OUTPATIENT
Start: 2025-04-22

## 2025-04-22 NOTE — TELEPHONE ENCOUNTER
Name of Medication(s) Requested:  Requested Prescriptions     Pending Prescriptions Disp Refills    omeprazole (PRILOSEC) 20 MG delayed release capsule 90 capsule 1     Sig: Take 1 capsule by mouth daily       Medication is on current medication list Yes    Dosage and directions were verified? Yes    Quantity verified: 90 day supply     Pharmacy Verified?  Yes    Last Appointment:  Visit date not found    Future appts:  No future appointments.     (If no appt send self scheduling link. .REFILLAPPT)  Scheduling request sent?     [] Yes  [x] No    Does patient need updated?  [] Yes  [x] No

## 2025-05-08 RX ORDER — OMEPRAZOLE 20 MG/1
20 CAPSULE, DELAYED RELEASE ORAL DAILY
Qty: 90 CAPSULE | Refills: 1 | Status: SHIPPED | OUTPATIENT
Start: 2025-05-08

## 2025-06-14 RX ORDER — ATORVASTATIN CALCIUM 80 MG/1
80 TABLET, FILM COATED ORAL DAILY
Qty: 90 TABLET | Refills: 0 | Status: SHIPPED | OUTPATIENT
Start: 2025-06-14 | End: 2025-09-12